# Patient Record
Sex: FEMALE | Race: WHITE | NOT HISPANIC OR LATINO | Employment: PART TIME | URBAN - NONMETROPOLITAN AREA
[De-identification: names, ages, dates, MRNs, and addresses within clinical notes are randomized per-mention and may not be internally consistent; named-entity substitution may affect disease eponyms.]

---

## 2019-10-25 DIAGNOSIS — D80.9 IMMUNOGLOBULIN DEFICIENCY (HCC): Primary | ICD-10-CM

## 2019-10-28 ENCOUNTER — CONSULT (OUTPATIENT)
Dept: ONCOLOGY | Facility: CLINIC | Age: 64
End: 2019-10-28

## 2019-10-28 ENCOUNTER — LAB (OUTPATIENT)
Dept: LAB | Facility: HOSPITAL | Age: 64
End: 2019-10-28

## 2019-10-28 VITALS
HEART RATE: 80 BPM | SYSTOLIC BLOOD PRESSURE: 118 MMHG | DIASTOLIC BLOOD PRESSURE: 84 MMHG | OXYGEN SATURATION: 96 % | TEMPERATURE: 97.8 F | RESPIRATION RATE: 16 BRPM | HEIGHT: 65 IN

## 2019-10-28 DIAGNOSIS — R76.9 ABNORMAL IMMUNOELECTROPHORESIS: ICD-10-CM

## 2019-10-28 DIAGNOSIS — R76.9 ABNORMAL IMMUNOELECTROPHORESIS: Primary | ICD-10-CM

## 2019-10-28 DIAGNOSIS — D80.9 IMMUNOGLOBULIN DEFICIENCY (HCC): Primary | ICD-10-CM

## 2019-10-28 PROBLEM — K62.5 RECTAL HEMORRHAGE: Status: ACTIVE | Noted: 2019-10-28

## 2019-10-28 LAB
ALBUMIN SERPL-MCNC: 5.1 G/DL (ref 3.5–5.2)
ALBUMIN/GLOB SERPL: 2 G/DL
ALP SERPL-CCNC: 72 U/L (ref 39–117)
ALT SERPL W P-5'-P-CCNC: 28 U/L (ref 1–33)
ANION GAP SERPL CALCULATED.3IONS-SCNC: 16 MMOL/L (ref 5–15)
AST SERPL-CCNC: 25 U/L (ref 1–32)
BASOPHILS # BLD AUTO: 0.02 10*3/MM3 (ref 0–0.2)
BASOPHILS NFR BLD AUTO: 0.6 % (ref 0–1.5)
BILIRUB SERPL-MCNC: 0.4 MG/DL (ref 0.2–1.2)
BUN BLD-MCNC: 21 MG/DL (ref 8–23)
BUN/CREAT SERPL: 22.1 (ref 7–25)
CALCIUM SPEC-SCNC: 9.5 MG/DL (ref 8.6–10.5)
CHLORIDE SERPL-SCNC: 100 MMOL/L (ref 98–107)
CO2 SERPL-SCNC: 26 MMOL/L (ref 22–29)
CREAT BLD-MCNC: 0.95 MG/DL (ref 0.57–1)
DEPRECATED RDW RBC AUTO: 40.7 FL (ref 37–54)
EOSINOPHIL # BLD AUTO: 0.22 10*3/MM3 (ref 0–0.4)
EOSINOPHIL NFR BLD AUTO: 6.2 % (ref 0.3–6.2)
ERYTHROCYTE [DISTWIDTH] IN BLOOD BY AUTOMATED COUNT: 11.9 % (ref 12.3–15.4)
GFR SERPL CREATININE-BSD FRML MDRD: 60 ML/MIN/1.73
GLOBULIN UR ELPH-MCNC: 2.5 GM/DL
GLUCOSE BLD-MCNC: 105 MG/DL (ref 65–99)
HCT VFR BLD AUTO: 39.3 % (ref 34–46.6)
HGB BLD-MCNC: 14 G/DL (ref 12–15.9)
HOLD SPECIMEN: NORMAL
IGA1 MFR SER: 70 MG/DL (ref 70–400)
IGG1 SER-MCNC: 594 MG/DL (ref 700–1600)
IGM SERPL-MCNC: 218 MG/DL (ref 40–230)
IMM GRANULOCYTES # BLD AUTO: 0.01 10*3/MM3 (ref 0–0.05)
IMM GRANULOCYTES NFR BLD AUTO: 0.3 % (ref 0–0.5)
LDH SERPL-CCNC: 218 U/L (ref 135–214)
LYMPHOCYTES # BLD AUTO: 1.42 10*3/MM3 (ref 0.7–3.1)
LYMPHOCYTES NFR BLD AUTO: 39.9 % (ref 19.6–45.3)
MCH RBC QN AUTO: 33.3 PG (ref 26.6–33)
MCHC RBC AUTO-ENTMCNC: 35.6 G/DL (ref 31.5–35.7)
MCV RBC AUTO: 93.3 FL (ref 79–97)
MONOCYTES # BLD AUTO: 0.4 10*3/MM3 (ref 0.1–0.9)
MONOCYTES NFR BLD AUTO: 11.2 % (ref 5–12)
NEUTROPHILS # BLD AUTO: 1.49 10*3/MM3 (ref 1.7–7)
NEUTROPHILS NFR BLD AUTO: 41.8 % (ref 42.7–76)
NRBC BLD AUTO-RTO: 0 /100 WBC (ref 0–0.2)
PLATELET # BLD AUTO: 357 10*3/MM3 (ref 140–450)
PMV BLD AUTO: 9 FL (ref 6–12)
POTASSIUM BLD-SCNC: 3.9 MMOL/L (ref 3.5–5.2)
PROT SERPL-MCNC: 7.6 G/DL (ref 6–8.5)
RBC # BLD AUTO: 4.21 10*6/MM3 (ref 3.77–5.28)
SODIUM BLD-SCNC: 142 MMOL/L (ref 136–145)
WBC NRBC COR # BLD: 3.56 10*3/MM3 (ref 3.4–10.8)
WHOLE BLOOD HOLD SPECIMEN: NORMAL

## 2019-10-28 PROCEDURE — 84165 PROTEIN E-PHORESIS SERUM: CPT

## 2019-10-28 PROCEDURE — 36415 COLL VENOUS BLD VENIPUNCTURE: CPT

## 2019-10-28 PROCEDURE — 83615 LACTATE (LD) (LDH) ENZYME: CPT

## 2019-10-28 PROCEDURE — 80053 COMPREHEN METABOLIC PANEL: CPT | Performed by: INTERNAL MEDICINE

## 2019-10-28 PROCEDURE — 82784 ASSAY IGA/IGD/IGG/IGM EACH: CPT

## 2019-10-28 PROCEDURE — 99205 OFFICE O/P NEW HI 60 MIN: CPT | Performed by: INTERNAL MEDICINE

## 2019-10-28 PROCEDURE — 85025 COMPLETE CBC W/AUTO DIFF WBC: CPT | Performed by: INTERNAL MEDICINE

## 2019-10-28 RX ORDER — ROPINIROLE 2 MG/1
2 TABLET, FILM COATED ORAL NIGHTLY
COMMUNITY

## 2019-10-28 RX ORDER — BUPROPION HYDROCHLORIDE 300 MG/1
300 TABLET ORAL DAILY
COMMUNITY

## 2019-10-28 RX ORDER — LEVOTHYROXINE SODIUM 88 UG/1
88 TABLET ORAL DAILY
COMMUNITY

## 2019-10-28 NOTE — PROGRESS NOTES
[facility]  HEMATOLOGY & ONCOLOGY        Subjective     VISIT DIAGNOSIS:   Encounter Diagnosis   Name Primary?   • Abnormal immunoelectrophoresis Yes       REASON FOR VISIT:     Chief Complaint   Patient presents with   • Abnormal Immunoglobulin     Here for initial consult        HEMATOLOGY / ONCOLOGY HISTORY:    No history exists.     [No treatment plan]  Cancer Staging Information:  Cancer Staging  No matching staging information was found for the patient.      INTERVAL HISTORY  Patient ID: Evelyn Mccoy is a 64 y.o. year old female  Referred to me for low immunoglobulin level. Review of OSH labs show IgA 78nl, IgG 693(nl >700), IgM 242 a tard elevated. Patient denies sicker than most people. Never has pneumonia. Does not have frequent UTI or URI.  Fhx sig for daughter with carrier for cystic fibrosis. Patient does not have lung issues or liver problems. As at the time the labs was done, she had a tooth infection. Currently resolved.  Denies sob, cp, n/v, dizziness, weight loss, night sweats, abdominal pain of focal weakness.She has bilateral knee pain from OA.  PE unremarkable.    Review of Systems   See above. Otherwise nega      Medications:    Current Outpatient Medications   Medication Sig Dispense Refill   • buPROPion XL (WELLBUTRIN XL) 300 MG 24 hr tablet Take 300 mg by mouth Daily.     • levothyroxine (SYNTHROID, LEVOTHROID) 88 MCG tablet Take 88 mcg by mouth Daily.     • MELATONIN PO Take  by mouth.     • rOPINIRole (REQUIP) 2 MG tablet Take 2 mg by mouth Every Night.       No current facility-administered medications for this visit.        ALLERGIES:    Allergies   Allergen Reactions   • Hydromorphone Hcl Itching   • Meperidine Itching       Objective      Vitals:    10/28/19 0952   BP: 118/84   Pulse: 80   Resp: 16   Temp: 97.8 °F (36.6 °C)   SpO2: 96%       Current Status 10/28/2019   ECOG score 1       General Appearance: Patient is awake, alert, oriented and in no acute distress.  Patient is welldeveloped, wellnourished, and appears stated age.  HEENT: Normocephalic. Sclerae clear, conjunctiva pink, extraocular movements intact, pupils, round, reactive to light and  accommodation. Mouth and throat are clear with moist oral mucosa.  NECK: Supple, no jugular venous distention, thyroid not enlarged.  LYMPH: No cervical, supraclavicular, axillary, or inguinal lymphadenopathy.  CHEST: Equal bilateral expansion, AP  diameter normal, resonant percussion note  LUNGS: Good air movement, no rales, rhonchi, rubs or wheezes with auscultation  CARDIO: Regular sinus rhythm, no murmurs, gallops or rubs.  ABDOMEN: Nondistended, soft, No tenderness, no guarding, no rebound, No hepatosplenomegaly. No abdominal masses. Bowel sounds positive. No hernia  GENITALIA: Not examined.  BREASTS: Not examined.  MUSKEL: No joint swelling, decreased motion, or inflammation  EXTREMS: No edema, clubbing, cyanosis, No varicose veins.  NEURO: Grossly nonfocal, Gait is coordinated and smooth, Cognition is preserved.  SKIN: No rashes, no ecchymoses, no petechia.  PSYCH: Oriented to time, place and person. Memory is preserved. Mood and affect appear normal      RECENT LABS:  Orders Only on 10/25/2019   Component Date Value Ref Range Status   • Glucose 10/28/2019 105* 65 - 99 mg/dL Final   • BUN 10/28/2019 21  8 - 23 mg/dL Final   • Creatinine 10/28/2019 0.95  0.57 - 1.00 mg/dL Final   • Sodium 10/28/2019 142  136 - 145 mmol/L Final   • Potassium 10/28/2019 3.9  3.5 - 5.2 mmol/L Final   • Chloride 10/28/2019 100  98 - 107 mmol/L Final   • CO2 10/28/2019 26.0  22.0 - 29.0 mmol/L Final   • Calcium 10/28/2019 9.5  8.6 - 10.5 mg/dL Final   • Total Protein 10/28/2019 7.6  6.0 - 8.5 g/dL Final   • Albumin 10/28/2019 5.10  3.50 - 5.20 g/dL Final   • ALT (SGPT) 10/28/2019 28  1 - 33 U/L Final   • AST (SGOT) 10/28/2019 25  1 - 32 U/L Final   • Alkaline Phosphatase 10/28/2019 72  39 - 117 U/L Final   • Total Bilirubin 10/28/2019 0.4  0.2 -  1.2 mg/dL Final   • eGFR Non African Amer 10/28/2019 60* >60 mL/min/1.73 Final   • Globulin 10/28/2019 2.5  gm/dL Final   • A/G Ratio 10/28/2019 2.0  g/dL Final   • BUN/Creatinine Ratio 10/28/2019 22.1  7.0 - 25.0 Final   • Anion Gap 10/28/2019 16.0* 5.0 - 15.0 mmol/L Final   • WBC 10/28/2019 3.56  3.40 - 10.80 10*3/mm3 Final   • RBC 10/28/2019 4.21  3.77 - 5.28 10*6/mm3 Final   • Hemoglobin 10/28/2019 14.0  12.0 - 15.9 g/dL Final   • Hematocrit 10/28/2019 39.3  34.0 - 46.6 % Final   • MCV 10/28/2019 93.3  79.0 - 97.0 fL Final   • MCH 10/28/2019 33.3* 26.6 - 33.0 pg Final   • MCHC 10/28/2019 35.6  31.5 - 35.7 g/dL Final   • RDW 10/28/2019 11.9* 12.3 - 15.4 % Final   • RDW-SD 10/28/2019 40.7  37.0 - 54.0 fl Final   • MPV 10/28/2019 9.0  6.0 - 12.0 fL Final   • Platelets 10/28/2019 357  140 - 450 10*3/mm3 Final   • Neutrophil % 10/28/2019 41.8* 42.7 - 76.0 % Final   • Lymphocyte % 10/28/2019 39.9  19.6 - 45.3 % Final   • Monocyte % 10/28/2019 11.2  5.0 - 12.0 % Final   • Eosinophil % 10/28/2019 6.2  0.3 - 6.2 % Final   • Basophil % 10/28/2019 0.6  0.0 - 1.5 % Final   • Immature Grans % 10/28/2019 0.3  0.0 - 0.5 % Final   • Neutrophils, Absolute 10/28/2019 1.49* 1.70 - 7.00 10*3/mm3 Final   • Lymphocytes, Absolute 10/28/2019 1.42  0.70 - 3.10 10*3/mm3 Final   • Monocytes, Absolute 10/28/2019 0.40  0.10 - 0.90 10*3/mm3 Final   • Eosinophils, Absolute 10/28/2019 0.22  0.00 - 0.40 10*3/mm3 Final   • Basophils, Absolute 10/28/2019 0.02  0.00 - 0.20 10*3/mm3 Final   • Immature Grans, Absolute 10/28/2019 0.01  0.00 - 0.05 10*3/mm3 Final   • nRBC 10/28/2019 0.0  0.0 - 0.2 /100 WBC Final       RADIOLOGY:  No results found.         Assessment/Plan 64 yof with mhx sig for hypothyroidism, depression, OA referred to me for low immunoglobulin.    1. Low Immunoglobulin level; Suspect not clinically meaningful in this patient who is not unusually sick, does not get UTI, URI or ever had pneumonia: will repeat labs, and also UPEP  rtc  in a month to discuss    2.Ovarian cancer: dz in the 70s. S/p hysterectomy+oophorectomy    3. Sophia Knee OA    4.Hypothryroidism: On synthroid  5. RLS: on requip  6. Depression: on wellbutrin  7. Insomnia on melatonin       Time Spent: 60 minutes; greater than  50% of time was spent in patient counseling and care coordination.     Feliciano Okeefe MD    10/28/2019    10:23 AM     CC: Yennifer Winston NP.

## 2019-10-29 LAB
ALBUMIN SERPL-MCNC: 4.3 G/DL (ref 2.9–4.4)
ALBUMIN/GLOB SERPL: 1.5 {RATIO} (ref 0.7–1.7)
ALPHA1 GLOB FLD ELPH-MCNC: 0.2 G/DL (ref 0–0.4)
ALPHA2 GLOB SERPL ELPH-MCNC: 0.7 G/DL (ref 0.4–1)
B-GLOBULIN SERPL ELPH-MCNC: 1.2 G/DL (ref 0.7–1.3)
GAMMA GLOB SERPL ELPH-MCNC: 0.8 G/DL (ref 0.4–1.8)
GLOBULIN SER CALC-MCNC: 2.9 G/DL (ref 2.2–3.9)
Lab: NORMAL
M-SPIKE: NORMAL G/DL
PROT PATTERN SERPL ELPH-IMP: NORMAL
PROT SERPL-MCNC: 7.2 G/DL (ref 6–8.5)